# Patient Record
Sex: MALE | Race: WHITE | NOT HISPANIC OR LATINO | Employment: STUDENT | URBAN - METROPOLITAN AREA
[De-identification: names, ages, dates, MRNs, and addresses within clinical notes are randomized per-mention and may not be internally consistent; named-entity substitution may affect disease eponyms.]

---

## 2020-02-27 ENCOUNTER — APPOINTMENT (OUTPATIENT)
Dept: RADIOLOGY | Facility: CLINIC | Age: 20
End: 2020-02-27
Payer: COMMERCIAL

## 2020-02-27 VITALS
DIASTOLIC BLOOD PRESSURE: 70 MMHG | SYSTOLIC BLOOD PRESSURE: 112 MMHG | HEIGHT: 69 IN | BODY MASS INDEX: 25.18 KG/M2 | WEIGHT: 170 LBS

## 2020-02-27 DIAGNOSIS — M25.511 RIGHT SHOULDER PAIN, UNSPECIFIED CHRONICITY: ICD-10-CM

## 2020-02-27 DIAGNOSIS — M75.21 BICEPS TENDINITIS OF RIGHT UPPER EXTREMITY: ICD-10-CM

## 2020-02-27 DIAGNOSIS — M25.511 RIGHT SHOULDER PAIN, UNSPECIFIED CHRONICITY: Primary | ICD-10-CM

## 2020-02-27 DIAGNOSIS — S46.011A ROTATOR CUFF STRAIN, RIGHT, INITIAL ENCOUNTER: ICD-10-CM

## 2020-02-27 PROCEDURE — 99203 OFFICE O/P NEW LOW 30 MIN: CPT | Performed by: FAMILY MEDICINE

## 2020-02-27 PROCEDURE — 73030 X-RAY EXAM OF SHOULDER: CPT

## 2020-02-27 NOTE — PROGRESS NOTES
Assessment:     1  Right shoulder pain, unspecified chronicity    2  Rotator cuff strain, right, initial encounter    3  Biceps tendinitis of right upper extremity        Plan:     Problem List Items Addressed This Visit        Musculoskeletal and Integument    Rotator cuff strain, right, initial encounter     Chronic right shoulder pain consistent with rotator cuff tendinosis  On clinical examination there is noted tightness with external rotation, abduction and shoulder flexion  Patient does have straight full range of active motion with noted stiffness at end points  I have recommended for the patient to begin physical therapy for shoulder strengthening and range of motion  He may attempt to resume throwing while under the care of his school  or physical therapist once greater range of motion has been achieved while remaining pain-free  Patient may follow up in the future as needed for any further concerns or questions  Relevant Orders    Ambulatory referral to Physical Therapy    Biceps tendinitis of right upper extremity    Relevant Orders    Ambulatory referral to Physical Therapy       Other    Right shoulder pain - Primary    Relevant Orders    XR shoulder 2+ vw right    Ambulatory referral to Physical Therapy         Subjective:     Patient ID: Baldemar Willis is a 23 y o  male  Chief Complaint:  Patient is a 77-year-old male  in his freshman year at [employer] presenting today for evaluation of his chronic right shoulder pain  He reports having pain in the shoulder since his sophomore year of high school  Pain is a throbbing, achy sensation along the posterior aspect of the shoulder and lateral aspect the shoulder that radiates down the right arm  Ice and anti-inflammatories have provided minimal relief  He reports slight improvement over the winter months when he was not throbbing however his pain was never completely resolved   He does admit to throwing with poor mechanics including throwing over the top and complete follow through  He denies any shoulder snapping or clicking  Allergy:  Allergies   Allergen Reactions    Augmentin [Amoxicillin-Pot Clavulanate] Hives     Medications:  all current active meds have been reviewed  Past Medical History:  History reviewed  No pertinent past medical history  Past Surgical History:  Past Surgical History:   Procedure Laterality Date    LYMPH NODE DISSECTION      TONSILLECTOMY       Family History:  Family History   Problem Relation Age of Onset    Hypertension Mother      Social History:  Social History     Substance and Sexual Activity   Alcohol Use Never    Frequency: Never     Social History     Substance and Sexual Activity   Drug Use Never     Social History     Tobacco Use   Smoking Status Never Smoker   Smokeless Tobacco Never Used     Review of Systems   Constitutional: Negative  HENT: Negative  Eyes: Negative  Respiratory: Negative  Cardiovascular: Negative  Gastrointestinal: Negative  Genitourinary: Negative  Musculoskeletal: Positive for arthralgias and myalgias  Skin: Negative  Allergic/Immunologic: Negative  Neurological: Negative  Hematological: Negative  Psychiatric/Behavioral: Negative  Objective:  BP Readings from Last 1 Encounters:   02/27/20 112/70      Wt Readings from Last 1 Encounters:   02/27/20 77 1 kg (170 lb) (72 %, Z= 0 58)*     * Growth percentiles are based on CDC (Boys, 2-20 Years) data  BMI:   Estimated body mass index is 25 1 kg/m² as calculated from the following:    Height as of this encounter: 5' 9" (1 753 m)  Weight as of this encounter: 77 1 kg (170 lb)  BSA:   Estimated body surface area is 1 93 meters squared as calculated from the following:    Height as of this encounter: 5' 9" (1 753 m)  Weight as of this encounter: 77 1 kg (170 lb)  Physical Exam   Constitutional: He appears well-developed  Eyes: Pupils are equal, round, and reactive to light  Neck: Normal range of motion  Pulmonary/Chest: Effort normal    Neurological: He is alert  Skin: Skin is warm  Psychiatric: He has a normal mood and affect  Right Shoulder Exam     Tenderness   The patient is experiencing tenderness in the acromioclavicular joint, acromion and biceps tendon  Range of Motion   Active abduction: abnormal   External rotation: abnormal   Forward flexion: abnormal   Internal rotation 0 degrees: normal   Internal rotation 90 degrees: normal     Muscle Strength   The patient has normal right shoulder strength  Tests   Apprehension: negative  [place] test: negative  Impingement: negative    Other   Erythema: absent            I have personally reviewed pertinent films in PACS     No acute osseous abnormalities

## 2020-02-27 NOTE — ASSESSMENT & PLAN NOTE
Chronic right shoulder pain consistent with rotator cuff tendinosis  On clinical examination there is noted tightness with external rotation, abduction and shoulder flexion  Patient does have straight full range of active motion with noted stiffness at end points  I have recommended for the patient to begin physical therapy for shoulder strengthening and range of motion  He may attempt to resume throwing while under the care of his school  or physical therapist once greater range of motion has been achieved while remaining pain-free  Patient may follow up in the future as needed for any further concerns or questions